# Patient Record
Sex: MALE | Race: WHITE | NOT HISPANIC OR LATINO
[De-identification: names, ages, dates, MRNs, and addresses within clinical notes are randomized per-mention and may not be internally consistent; named-entity substitution may affect disease eponyms.]

---

## 2022-02-28 PROBLEM — Z00.00 ENCOUNTER FOR PREVENTIVE HEALTH EXAMINATION: Status: ACTIVE | Noted: 2022-02-28

## 2022-03-03 ENCOUNTER — APPOINTMENT (OUTPATIENT)
Dept: RHEUMATOLOGY | Facility: CLINIC | Age: 57
End: 2022-03-03
Payer: MEDICAID

## 2022-03-03 ENCOUNTER — NON-APPOINTMENT (OUTPATIENT)
Age: 57
End: 2022-03-03

## 2022-03-03 DIAGNOSIS — M16.10 UNILATERAL PRIMARY OSTEOARTHRITIS, UNSPECIFIED HIP: ICD-10-CM

## 2022-03-03 PROCEDURE — 99203 OFFICE O/P NEW LOW 30 MIN: CPT

## 2022-03-03 NOTE — DATA REVIEWED
[FreeTextEntry1] : X-ray report was very recent this was done February 2, 2022 showed advanced severe right hip arthritis they also discussed some very mild changes of the left hip he also comes with routine bloods these were also reviewed his CBC and chemistries were all

## 2022-03-03 NOTE — REVIEW OF SYSTEMS
[Feeling Poorly] : feeling poorly [Feeling Tired] : feeling tired [As Noted in HPI] : as noted in HPI

## 2022-03-03 NOTE — PHYSICAL EXAM
[General Appearance - Alert] : alert [General Appearance - In No Acute Distress] : in no acute distress [General Appearance - Well Nourished] : well nourished [General Appearance - Well Developed] : well developed [Nail Clubbing] : no clubbing  or cyanosis of the fingernails [Musculoskeletal - Swelling] : no joint swelling seen [FreeTextEntry1] : There is severe limitation of the right hip in all directions left hip appears normal this hip limitation is also associated with pain no evidence of inflammation at the knees

## 2022-03-03 NOTE — HISTORY OF PRESENT ILLNESS
[FreeTextEntry1] : This is a 56-year-old man who presents for an initial rheumatology evaluation he first noticed some right hip pain and discomfort in June 2021 there is no prior injury this did progress and he was also having some pain in that thigh down to the knee he had some neurologic symptoms also he had undergone a laminectomy in October 2021 for spinal stenosis this had no effect on this since that time he has had increasing pain and discomfort in that hip is he has great deal of difficulty even moving the hip he has difficulty walking he has difficulty getting up and down from a chair it has rapidly progressed he does not have any other significant joint issues he has had no injury he perhaps has some occasional left hip discomfort he has no psoriasis he has had no fevers or chills

## 2022-03-03 NOTE — ASSESSMENT
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